# Patient Record
Sex: MALE | Race: BLACK OR AFRICAN AMERICAN | Employment: FULL TIME | URBAN - METROPOLITAN AREA
[De-identification: names, ages, dates, MRNs, and addresses within clinical notes are randomized per-mention and may not be internally consistent; named-entity substitution may affect disease eponyms.]

---

## 2021-04-06 ENCOUNTER — IMMUNIZATION (OUTPATIENT)
Dept: PRIMARY CARE CLINIC | Age: 61
End: 2021-04-06
Payer: COMMERCIAL

## 2021-04-06 PROCEDURE — 0011A COVID-19, MODERNA VACCINE 100MCG/0.5ML DOSE: CPT | Performed by: FAMILY MEDICINE

## 2021-04-06 PROCEDURE — 91301 COVID-19, MODERNA VACCINE 100MCG/0.5ML DOSE: CPT | Performed by: FAMILY MEDICINE

## 2021-05-04 ENCOUNTER — IMMUNIZATION (OUTPATIENT)
Dept: PRIMARY CARE CLINIC | Age: 61
End: 2021-05-04
Payer: COMMERCIAL

## 2021-05-04 PROCEDURE — 91301 COVID-19, MODERNA VACCINE 100MCG/0.5ML DOSE: CPT | Performed by: FAMILY MEDICINE

## 2021-05-04 PROCEDURE — 0012A COVID-19, MODERNA VACCINE 100MCG/0.5ML DOSE: CPT | Performed by: FAMILY MEDICINE

## 2024-06-13 ENCOUNTER — PREP FOR PROCEDURE (OUTPATIENT)
Dept: OPHTHALMOLOGY | Age: 64
End: 2024-06-13

## 2024-06-13 RX ORDER — TROPICAMIDE 10 MG/ML
1 SOLUTION/ DROPS OPHTHALMIC ONCE
Status: CANCELLED | OUTPATIENT
Start: 2024-06-13 | End: 2024-06-13

## 2024-06-13 RX ORDER — BRIMONIDINE TARTRATE 2 MG/ML
1 SOLUTION/ DROPS OPHTHALMIC ONCE
Status: CANCELLED | OUTPATIENT
Start: 2024-06-13 | End: 2024-06-13

## 2024-06-13 RX ORDER — PHENYLEPHRINE HYDROCHLORIDE 25 MG/ML
1 SOLUTION/ DROPS OPHTHALMIC ONCE
Status: CANCELLED | OUTPATIENT
Start: 2024-06-13 | End: 2024-06-13

## 2024-06-20 NOTE — PERIOP NOTE
[address]  [phone]        Pre-Op Phone Call:     Patient Name: Feng Aponte     Telephone Information:   Mobile [phone]     Home phone:  [phone]    Surgery Time:   8:20     Arrival Time:  7:05     Left extended Message:  Yes     Message left with: Spoke with patient     Recent change in health status:  No           Electronically signed by:  Alla Powell RN at 6/20/2024 9:02 AM

## 2024-06-21 ENCOUNTER — HOSPITAL ENCOUNTER (OUTPATIENT)
Dept: SURGERY | Age: 64
Discharge: HOME | End: 2024-06-21
Payer: COMMERCIAL

## 2024-06-21 VITALS — DIASTOLIC BLOOD PRESSURE: 82 MMHG | SYSTOLIC BLOOD PRESSURE: 122 MMHG

## 2024-06-21 PROCEDURE — 6370000000 HC RX 637 (ALT 250 FOR IP): Performed by: OPHTHALMOLOGY

## 2024-06-21 PROCEDURE — 66821 AFTER CATARACT LASER SURGERY: CPT

## 2024-06-21 RX ORDER — LISINOPRIL 20 MG/1
20 TABLET ORAL DAILY
COMMUNITY

## 2024-06-21 RX ORDER — HYDROCHLOROTHIAZIDE 25 MG/1
25 TABLET ORAL DAILY
COMMUNITY

## 2024-06-21 RX ORDER — ALLOPURINOL 100 MG/1
100 TABLET ORAL DAILY
COMMUNITY

## 2024-06-21 RX ORDER — TROPICAMIDE 10 MG/ML
1 SOLUTION/ DROPS OPHTHALMIC ONCE
Status: COMPLETED | OUTPATIENT
Start: 2024-06-21 | End: 2024-06-21

## 2024-06-21 RX ORDER — PHENYLEPHRINE HYDROCHLORIDE 25 MG/ML
1 SOLUTION/ DROPS OPHTHALMIC ONCE
Status: COMPLETED | OUTPATIENT
Start: 2024-06-21 | End: 2024-06-21

## 2024-06-21 RX ORDER — BRIMONIDINE TARTRATE 2 MG/ML
1 SOLUTION/ DROPS OPHTHALMIC ONCE
Status: COMPLETED | OUTPATIENT
Start: 2024-06-21 | End: 2024-06-21

## 2024-06-21 RX ADMIN — TROPICAMIDE 1 DROP: 10 SOLUTION/ DROPS OPHTHALMIC at 07:25

## 2024-06-21 RX ADMIN — BRIMONIDINE TARTRATE 1 DROP: 2 SOLUTION/ DROPS OPHTHALMIC at 08:19

## 2024-06-21 RX ADMIN — PHENYLEPHRINE HYDROCHLORIDE 1 DROP: 25 SOLUTION/ DROPS OPHTHALMIC at 07:25

## 2024-06-21 NOTE — OP NOTE
[facility]  [address]  [address]   [place] [address]  [phone]      6/21/2024    Patient name: Feng Aponte  YOB: 1960  MRN: [id number]    [place]:   Allergies   Allergen Reactions    Shellfish-Derived Products Anaphylaxis     Patient reported       Pre-operative diagnosis:  After cataract obscuring vision of the left eye     Post-operative diagnosis:  Same    Procedure Performed:  YAG Capsulotomy of the left eye     Anesthesia:  None    Estimated Blood Loss: None    Specimens removed: None    Complications:  None    Description of Procedure:  The patient was taken to the laser suite at [facility] where the operative site was confirmed.  Topical ophthalmic anesthesia was instilled in the operative eye followed by dilating drops. A YAG capsulotomy lens with goniosol was placed on the operative eye.  The YAG laser was used to treat the posterior capsule opacification with 18  spots using a power of 3.8 mJ.  The total power used was 66.1 mJ.  The posterior capsule appeared nicely opened and clear centrally following the procedure.      Electronically signed by: Kanwal Lambert MD,6/21/2024,8:22 AM

## 2024-06-21 NOTE — DISCHARGE INSTRUCTIONS
[facility]   [address], [place] [address]   [phone]     HOME CARE INSTRUCTIONS FOLLOWING LASER PROCEDURES         @ED@    Patient Name: Feng Aponte  : 1960  MRN: [id number]      Notify your physician if you have rash, hives, difficulty breathing, or severe nausea or vomiting.  Contact your family physician for questions concerning your current home medications.  If pain intensifies or pain is unrelieved with pain medicine as ordered, call your physician.  If physician is unavailable, go to the Emergency Room.    ADDITIONAL INSTRUCTIONS     Rest today       Report any decreased vision or increased pain.       Call the physician’s office to schedule your post op visit in: 2-3 weeks.    Medications: ***      You should have no severe pain after surgery. You may take Tylenol   (acetaminophen) for discomfort as needed as long as you do not have any liver problems. Your eye may feel irritated or scratchy. If pain becomes severe, call the doctor’s office immediately.  CALL THE OFFICE IMMEDIATELY IF YOU EXPERIENCE ANY OF THE FOLLOWING:  Sudden decrease in vision, severe pain, redness, discharge, new floaters, flashes of light, or veil over a portion of your vision.      Patient and/or responsible party verbalizes understanding of above instructions.    Tylenol is usually sufficient for any discomfort that you may feel.    It is not uncommon for you to experience the sensation of a large “floater” in your vision. If you experience hundreds of thousands of floaters that do not stop, flashing or arcing lights that do not stop, or a curtain or veil of blackness that you cannot see through, that does not go away, please call the office at [phone] or Toll Free [phone].     THERE IS AN EYE PHYSICIAN ON CALL 24 HOURS A DAY, SEVEN DAYS A WEEK--CALL FOR ANY QUESTIONS/PROBLEMS

## 2024-06-21 NOTE — PROGRESS NOTES
Patient: Feng Aponte  1960, 63 y.o./male    Ambulatory to laser room.  Left eye marked and numbed by Dr. Lambert .  Laser procedure done and tolerated well by patient.  Post  instructions given to Feng by provider.      Power setting was 3.8        # of shots was 18    Total power was 66.1 mJ    Camryn Hopkins RN